# Patient Record
Sex: MALE | HISPANIC OR LATINO | Employment: UNEMPLOYED | URBAN - METROPOLITAN AREA
[De-identification: names, ages, dates, MRNs, and addresses within clinical notes are randomized per-mention and may not be internally consistent; named-entity substitution may affect disease eponyms.]

---

## 2020-07-22 RX ORDER — SODIUM, POTASSIUM,MAG SULFATES 17.5-3.13G
2 SOLUTION, RECONSTITUTED, ORAL ORAL TAKE AS DIRECTED
Qty: 354 ML | Refills: 0 | Status: SHIPPED | OUTPATIENT
Start: 2020-07-22

## 2020-07-26 ENCOUNTER — APPOINTMENT (OUTPATIENT)
Dept: PREADMISSION TESTING | Facility: HOSPITAL | Age: 65
End: 2020-07-26

## 2020-07-26 PROCEDURE — U0002 COVID-19 LAB TEST NON-CDC: HCPCS

## 2020-07-26 PROCEDURE — C9803 HOPD COVID-19 SPEC COLLECT: HCPCS

## 2020-07-26 PROCEDURE — U0004 COV-19 TEST NON-CDC HGH THRU: HCPCS

## 2020-07-27 LAB
REF LAB TEST METHOD: NORMAL
SARS-COV-2 RNA RESP QL NAA+PROBE: NOT DETECTED

## 2020-07-29 ENCOUNTER — OUTSIDE FACILITY SERVICE (OUTPATIENT)
Dept: GASTROENTEROLOGY | Facility: CLINIC | Age: 65
End: 2020-07-29

## 2020-07-29 PROCEDURE — 45385 COLONOSCOPY W/LESION REMOVAL: CPT | Performed by: INTERNAL MEDICINE

## 2020-08-17 ENCOUNTER — PREP FOR SURGERY (OUTPATIENT)
Dept: OTHER | Facility: HOSPITAL | Age: 65
End: 2020-08-17

## 2020-08-17 DIAGNOSIS — Z11.59 SPECIAL SCREENING EXAMINATION FOR UNSPECIFIED VIRAL DISEASE: Primary | ICD-10-CM

## 2020-08-17 DIAGNOSIS — H25.811 COMBINED FORMS OF AGE-RELATED CATARACT OF RIGHT EYE: Primary | ICD-10-CM

## 2020-08-17 RX ORDER — PREDNISOLONE ACETATE 10 MG/ML
1 SUSPENSION/ DROPS OPHTHALMIC SEE ADMIN INSTRUCTIONS
Status: CANCELLED | OUTPATIENT
Start: 2020-08-21

## 2020-08-17 RX ORDER — TETRACAINE HYDROCHLORIDE 5 MG/ML
1 SOLUTION OPHTHALMIC SEE ADMIN INSTRUCTIONS
Status: CANCELLED | OUTPATIENT
Start: 2020-08-21

## 2020-08-17 RX ORDER — CYCLOPENTOLATE HYDROCHLORIDE 20 MG/ML
1 SOLUTION/ DROPS OPHTHALMIC
Status: CANCELLED | OUTPATIENT
Start: 2020-08-21 | End: 2020-08-21

## 2020-08-17 RX ORDER — PHENYLEPHRINE HYDROCHLORIDE 100 MG/ML
1 SOLUTION/ DROPS OPHTHALMIC
Status: CANCELLED | OUTPATIENT
Start: 2020-08-21 | End: 2020-08-21

## 2020-08-17 RX ORDER — SODIUM CHLORIDE 0.9 % (FLUSH) 0.9 %
1-10 SYRINGE (ML) INJECTION AS NEEDED
Status: CANCELLED | OUTPATIENT
Start: 2020-08-21

## 2020-08-17 RX ORDER — SODIUM CHLORIDE 0.9 % (FLUSH) 0.9 %
3 SYRINGE (ML) INJECTION EVERY 12 HOURS SCHEDULED
Status: CANCELLED | OUTPATIENT
Start: 2020-08-21

## 2020-08-18 ENCOUNTER — LAB (OUTPATIENT)
Dept: LAB | Facility: HOSPITAL | Age: 65
End: 2020-08-18

## 2020-08-18 DIAGNOSIS — Z11.59 SPECIAL SCREENING EXAMINATION FOR UNSPECIFIED VIRAL DISEASE: ICD-10-CM

## 2020-08-18 PROBLEM — H25.812 COMBINED FORMS OF AGE-RELATED CATARACT OF LEFT EYE: Status: ACTIVE | Noted: 2020-08-18

## 2020-08-18 PROCEDURE — U0004 COV-19 TEST NON-CDC HGH THRU: HCPCS

## 2020-08-18 PROCEDURE — U0002 COVID-19 LAB TEST NON-CDC: HCPCS

## 2020-08-18 PROCEDURE — C9803 HOPD COVID-19 SPEC COLLECT: HCPCS

## 2020-08-19 LAB
REF LAB TEST METHOD: ABNORMAL
SARS-COV-2 RNA RESP QL NAA+PROBE: DETECTED

## 2020-09-23 ENCOUNTER — OFFICE VISIT (OUTPATIENT)
Dept: GASTROENTEROLOGY | Facility: CLINIC | Age: 65
End: 2020-09-23

## 2020-09-23 VITALS
TEMPERATURE: 97.8 F | HEART RATE: 77 BPM | SYSTOLIC BLOOD PRESSURE: 155 MMHG | DIASTOLIC BLOOD PRESSURE: 90 MMHG | BODY MASS INDEX: 28.44 KG/M2 | WEIGHT: 192.6 LBS

## 2020-09-23 DIAGNOSIS — K21.9 GASTROESOPHAGEAL REFLUX DISEASE WITHOUT ESOPHAGITIS: ICD-10-CM

## 2020-09-23 DIAGNOSIS — K31.84 GASTROPARESIS: ICD-10-CM

## 2020-09-23 DIAGNOSIS — K22.70 BARRETT'S ESOPHAGUS WITHOUT DYSPLASIA: Primary | ICD-10-CM

## 2020-09-23 DIAGNOSIS — Z86.010 HISTORY OF ADENOMATOUS POLYP OF COLON: ICD-10-CM

## 2020-09-23 PROCEDURE — 99214 OFFICE O/P EST MOD 30 MIN: CPT | Performed by: INTERNAL MEDICINE

## 2020-09-23 RX ORDER — PREDNISOLONE ACETATE 10 MG/ML
1 SUSPENSION/ DROPS OPHTHALMIC 2 TIMES DAILY
COMMUNITY

## 2020-09-23 RX ORDER — METOCLOPRAMIDE 10 MG/1
10 TABLET ORAL 4 TIMES DAILY
Qty: 120 TABLET | Refills: 3 | Status: SHIPPED | OUTPATIENT
Start: 2020-09-23

## 2020-09-23 RX ORDER — MAGNESIUM 200 MG
1 TABLET ORAL DAILY
COMMUNITY

## 2020-09-23 NOTE — PROGRESS NOTES
"GASTROENTEROLOGY OFFICE NOTE  Memo Yan  [id number]  1955    CARE TEAM  Patient Care Team:  Provider, No Known as PCP - General    No ref. provider found     Chief Complaint   Patient presents with   • Follow-up     Post EGd and Colonoscopy         HISTORY OF PRESENT ILLNESS:  Patient status post colonoscopy and EGD this summer remarkable adenomatous colonic polyps.    He does have chronic reflux and is felt to have gastroparesis.    His reflux is doing well on omeprazole 40 mg daily but he does still have a lot of postprandial abdominal fullness, bloating and when he gets more bloated than usual he will have regurgitation and on some very rare occasions he is had some vomiting.  He does better with small volume meals and avoiding greasy/fatty foods.    PAST MEDICAL HISTORY  Past Medical History:   Diagnosis Date   • Arthritis    • Broken leg 2017    right   • Cataracts, bilateral     had right cataract removed a \"few weeks ago\" per son   • Colon polyps    • Enlarged prostate    • Gastritis    • GERD (gastroesophageal reflux disease)    • Hyperlipidemia    • Hypertension    • Wears glasses    • Wears partial dentures     upper only asked not to wear adhesive dos        PAST SURGICAL HISTORY  Past Surgical History:   Procedure Laterality Date   • CATARACT EXTRACTION Right    • COLONOSCOPY     • MULTIPLE TOOTH EXTRACTIONS     • OTHER SURGICAL HISTORY  2017    Tibia and Fibula procedure         MEDICATIONS:    Current Outpatient Medications:   •  aspirin 81 MG chewable tablet, Chew 81 mg Daily., Disp: , Rfl:   •  atorvastatin (LIPITOR) 20 MG tablet, Take 20 mg by mouth Daily., Disp: , Rfl:   •  lisinopril-hydrochlorothiazide (PRINZIDE,ZESTORETIC) 20-25 MG per tablet, Take 1 tablet by mouth Daily., Disp: , Rfl:   •  Magnesium 200 MG tablet, Take 1 tablet by mouth Daily., Disp: , Rfl:   •  omeprazole (priLOSEC) 40 MG capsule, Take 40 mg by mouth Daily., Disp: , Rfl:   •  prednisoLONE acetate (PRED FORTE) 1 % " ophthalmic suspension, 1 drop 2 (two) times a day., Disp: , Rfl:   •  tamsulosin (FLOMAX) 0.4 MG capsule 24 hr capsule, Take 1 capsule by mouth Daily., Disp: , Rfl:   •  meloxicam (MOBIC) 7.5 MG tablet, Take 7.5 mg by mouth Daily., Disp: , Rfl:   •  sodium-potassium-magnesium sulfates (Suprep Bowel Prep Kit) 17.5-3.13-1.6 GM/177ML solution oral solution, Take 2 bottles by mouth Take As Directed. Do Not Eat The Day Before Your Procedure. Call [phone] for questions., Disp: 354 mL, Rfl: 0    ALLERGIES  No Known Allergies    FAMILY HISTORY:  Family History   Problem Relation Age of Onset   • Colon cancer Neg Hx    • Colon polyps Neg Hx        SOCIAL HISTORY  Social History     Socioeconomic History   • Marital status:      Spouse name: Not on file   • Number of children: Not on file   • Years of education: Not on file   • Highest education level: Not on file   Tobacco Use   • Smoking status: Former Smoker     Types: Cigarettes     Quit date: 2010     Years since quitting: 10.7   • Smokeless tobacco: Never Used   Substance and Sexual Activity   • Alcohol use: Not Currently     Frequency: Never     Comment: Quit drinking 10 years ago    • Drug use: Never   • Sexual activity: Defer     Socioeconomic History:  .  2 children.  Rarely drinks alcoholic beverages.  Quit smoking 15 years ago       REVIEW OF SYSTEMS  Review of Systems   Constitutional: Negative for activity change, appetite change, chills, diaphoresis, fatigue, fever, unexpected weight gain and unexpected weight loss.   HENT: Positive for dental problem. Negative for congestion, drooling, ear discharge, ear pain, facial swelling, hearing loss, mouth sores, nosebleeds, postnasal drip, rhinorrhea, sinus pressure, sneezing, sore throat, swollen glands, tinnitus, trouble swallowing and voice change.    Respiratory: Negative for apnea, cough, choking, chest tightness, shortness of breath, wheezing and stridor.    Cardiovascular: Negative for chest  pain, palpitations and leg swelling.   Gastrointestinal: Positive for abdominal distention, vomiting, GERD and indigestion. Negative for abdominal pain, anal bleeding, blood in stool, constipation, diarrhea, nausea and rectal pain.   Endocrine: Negative for cold intolerance, heat intolerance, polydipsia, polyphagia and polyuria.   Musculoskeletal: Negative for arthralgias, back pain, gait problem, joint swelling, myalgias, neck pain, neck stiffness and bursitis.   Allergic/Immunologic: Negative for environmental allergies, food allergies and immunocompromised state.   Neurological: Negative for dizziness, tremors, seizures, facial asymmetry, speech difficulty, weakness, light-headedness, numbness and confusion.   Hematological: Negative for adenopathy. Does not bruise/bleed easily.   Psychiatric/Behavioral: Negative for agitation, behavioral problems, decreased concentration, dysphoric mood, hallucinations, self-injury, sleep disturbance, suicidal ideas, negative for hyperactivity, depressed mood and stress. The patient is not nervous/anxious.      I reviewed the above-noted review of systems    PHYSICAL EXAM   There were no vitals taken for this visit.  General: Alert and oriented x 3. In no apparent or acute distress.  and No stigmata of chronic liver disease  HEENT: Anicteric sclera.  Wearing facemask  Neck: Supple. Without lymphadenopathy  CV: Regular rate and rhythm, S1, S2  Lungs: Clear to ausculation. Without rales, rhonchi and wheezing  Abdomen:  Soft,non-distended without palpable masses or hepatosplenomeagaly, areas of rebound tenderness or guarding.   Extremeties: without clubbing, cyanosis or edema  Neurologic:  Alert and oriented x 3 without focal motor or sensory deficits  Rectal exam: deferred        ASSESSMENT  1.-  Short segment Roque's esophagus.  Nondysplastic  2.-  GERD.  3.-  History of adenomatous and tubulovillous adenomatous polyps.  Surveillance colonoscopy recommended in July 2023  4.-   Gastroparesis.    PLAN  1.-  Trial of metoclopramide 10 mg p.o. 3 times daily after before meals.  2.-  Patient made aware of the risk of permanent tardive dyskinesia with long-term use of metoclopramide  3.-  Dietary modification gastroparesis reviewed  4.-  Continue omeprazole  5.-  Surveillance colonoscopy July 2023    Jacob Cuevas MD  9/23/2020   14:52 EDT

## 2020-09-24 PROBLEM — Z86.010 HISTORY OF ADENOMATOUS POLYP OF COLON: Status: ACTIVE | Noted: 2020-09-24

## 2020-09-24 PROBLEM — K22.70 BARRETT'S ESOPHAGUS WITHOUT DYSPLASIA: Status: ACTIVE | Noted: 2020-09-24

## 2020-09-24 PROBLEM — K31.84 GASTROPARESIS: Status: ACTIVE | Noted: 2020-09-24

## 2020-09-24 PROBLEM — K21.9 GASTROESOPHAGEAL REFLUX DISEASE WITHOUT ESOPHAGITIS: Status: ACTIVE | Noted: 2020-09-24

## 2020-09-24 PROBLEM — Z86.0101 HISTORY OF ADENOMATOUS POLYP OF COLON: Status: ACTIVE | Noted: 2020-09-24

## 2020-10-11 ENCOUNTER — APPOINTMENT (OUTPATIENT)
Dept: PREADMISSION TESTING | Facility: HOSPITAL | Age: 65
End: 2020-10-11

## 2020-10-11 PROCEDURE — U0004 COV-19 TEST NON-CDC HGH THRU: HCPCS | Performed by: INTERNAL MEDICINE

## 2020-10-11 PROCEDURE — C9803 HOPD COVID-19 SPEC COLLECT: HCPCS

## 2020-10-12 LAB — SARS-COV-2 RNA RESP QL NAA+PROBE: NOT DETECTED

## 2020-11-18 ENCOUNTER — OFFICE VISIT (OUTPATIENT)
Dept: GASTROENTEROLOGY | Facility: CLINIC | Age: 65
End: 2020-11-18

## 2020-11-18 VITALS
SYSTOLIC BLOOD PRESSURE: 156 MMHG | BODY MASS INDEX: 29.33 KG/M2 | TEMPERATURE: 97.8 F | WEIGHT: 198.6 LBS | DIASTOLIC BLOOD PRESSURE: 85 MMHG | HEART RATE: 74 BPM

## 2020-11-18 DIAGNOSIS — K22.70 BARRETT'S ESOPHAGUS WITHOUT DYSPLASIA: ICD-10-CM

## 2020-11-18 DIAGNOSIS — K31.84 GASTROPARESIS: ICD-10-CM

## 2020-11-18 DIAGNOSIS — K21.9 GASTROESOPHAGEAL REFLUX DISEASE WITHOUT ESOPHAGITIS: Primary | ICD-10-CM

## 2020-11-18 DIAGNOSIS — Z86.010 HISTORY OF ADENOMATOUS POLYP OF COLON: ICD-10-CM

## 2020-11-18 PROCEDURE — 99213 OFFICE O/P EST LOW 20 MIN: CPT | Performed by: INTERNAL MEDICINE

## 2020-11-18 NOTE — PROGRESS NOTES
"GASTROENTEROLOGY OFFICE NOTE  Memo Yan  [id number]  1955    CARE TEAM  Patient Care Team:  Davis Gray MD as PCP - General (Internal Medicine)    No ref. provider found     Chief Complaint   Patient presents with   • Follow-up   • Roque's Esophagus   • Heartburn        HISTORY OF PRESENT ILLNESS:  Patient presents for follow-up of GERD/Roque's esophagus.    Patient states that he is doing well on omeprazole 40 mg daily and in addition to this taking metoclopramide once or twice daily has resolved his vomiting.  He is eating better denies dysphagia, odynophagia, early satiety, postprandial fullness or bloating.  He states he has modified his diet with small frequent meals avoiding greasy/fatty foods and overall his dyspeptic symptoms have improved.      PAST MEDICAL HISTORY  Past Medical History:   Diagnosis Date   • Arthritis    • Broken leg 2017    right   • Cataracts, bilateral     had right cataract removed a \"few weeks ago\" per son   • Colon polyps    • Enlarged prostate    • Gastritis    • GERD (gastroesophageal reflux disease)    • Hyperlipidemia    • Hypertension    • Wears glasses    • Wears partial dentures     upper only asked not to wear adhesive dos        PAST SURGICAL HISTORY  Past Surgical History:   Procedure Laterality Date   • CATARACT EXTRACTION Right    • COLONOSCOPY     • MULTIPLE TOOTH EXTRACTIONS     • OTHER SURGICAL HISTORY  2017    Tibia and Fibula procedure         MEDICATIONS:    Current Outpatient Medications:   •  aspirin 81 MG chewable tablet, Chew 81 mg Daily., Disp: , Rfl:   •  atorvastatin (LIPITOR) 20 MG tablet, Take 20 mg by mouth Daily., Disp: , Rfl:   •  lisinopril-hydrochlorothiazide (PRINZIDE,ZESTORETIC) 20-25 MG per tablet, Take 1 tablet by mouth Daily., Disp: , Rfl:   •  Magnesium 200 MG tablet, Take 1 tablet by mouth Daily., Disp: , Rfl:   •  meloxicam (MOBIC) 7.5 MG tablet, Take 7.5 mg by mouth Daily., Disp: , Rfl:   •  metoclopramide (REGLAN) 10 MG " tablet, Take 1 tablet by mouth 4 (Four) Times a Day., Disp: 120 tablet, Rfl: 3  •  omeprazole (priLOSEC) 40 MG capsule, Take 40 mg by mouth Daily., Disp: , Rfl:   •  prednisoLONE acetate (PRED FORTE) 1 % ophthalmic suspension, 1 drop 2 (two) times a day., Disp: , Rfl:   •  sodium-potassium-magnesium sulfates (Suprep Bowel Prep Kit) 17.5-3.13-1.6 GM/177ML solution oral solution, Take 2 bottles by mouth Take As Directed. Do Not Eat The Day Before Your Procedure. Call [phone] for questions., Disp: 354 mL, Rfl: 0  •  tamsulosin (FLOMAX) 0.4 MG capsule 24 hr capsule, Take 1 capsule by mouth Daily., Disp: , Rfl:     ALLERGIES  No Known Allergies    FAMILY HISTORY:  Family History   Problem Relation Age of Onset   • Colon cancer Neg Hx    • Colon polyps Neg Hx        SOCIAL HISTORY  Social History     Socioeconomic History   • Marital status:      Spouse name: Not on file   • Number of children: Not on file   • Years of education: Not on file   • Highest education level: Not on file   Tobacco Use   • Smoking status: Former Smoker     Types: Cigarettes     Quit date: 2010     Years since quitting: 10.8   • Smokeless tobacco: Never Used   Substance and Sexual Activity   • Alcohol use: Not Currently     Frequency: Never     Comment: Quit drinking 10 years ago    • Drug use: Never   • Sexual activity: Defer     Socioeconomic History:  He is  with children.  Quit drinking 10 years ago and quit smoking in 2010.       REVIEW OF SYSTEMS  Review of Systems   Constitutional: Negative for activity change, appetite change, chills, diaphoresis, fatigue, fever, unexpected weight gain and unexpected weight loss.   HENT: Negative for trouble swallowing and voice change.    Gastrointestinal: Positive for GERD. Negative for abdominal distention, abdominal pain, anal bleeding, blood in stool, constipation, diarrhea, nausea, rectal pain, vomiting and indigestion.     I reviewed the above-noted review of systems.    PHYSICAL  EXAM   /85 (BP Location: Left arm, Patient Position: Sitting, Cuff Size: Adult)   Pulse 74   Temp 97.8 °F (36.6 °C) (Temporal)   Wt 90.1 kg (198 lb 9.6 oz)   BMI 29.33 kg/m²   General: Alert and oriented x 3. In no apparent or acute distress.  and No stigmata of chronic liver disease  HEENT: Wearing facemask.  Anicteric sclera  Neck: Supple. Without lymphadenopathy  CV: Regular rate and rhythm, S1, S2  Lungs: Clear to ausculation. Without rales, rhonchi and wheezing  Abdomen:  Soft,non-distended without palpable masses or hepatosplenomeagaly, areas of rebound tenderness or guarding.   Extremeties: without clubbing, cyanosis or edema  Neurologic:  Alert and oriented x 3 without focal motor or sensory deficits  Rectal exam: deferred         ASSESSMENT  1.-Nondysplastic Roque's esophagus  2.-GERD  3.-Gastroparesis.  Patient reminded not to take metoclopramide on a regular basis due to the risk for permanent tardive dyskinesia.  Signs and symptoms of tardive dyskinesia were reviewed and he was counseled to take this no more than a few days per month.  If he needs to take it more frequently than that for control of his symptoms he is counseled to purchase so we can talk about alternatives such as domperidone, cisapride, another prokinetic agent such as prucalopride.  4.-History of adenomatous colonic polyps.    PLAN  1.-Patient to minimize use of metoclopramide  2.-Dietary modification gastroparesis again reviewed  3.-Continue omeprazole indefinitely  4.-Surveillance colonoscopy due July 2023  5.-Surveillance EGD recommended 2023  6.-GI follow-up as needed        Jacob Cuevas MD  11/18/2020   15:00 EST

## 2024-04-30 ENCOUNTER — OFFICE VISIT (OUTPATIENT)
Dept: GASTROENTEROLOGY | Facility: CLINIC | Age: 69
End: 2024-04-30
Payer: MEDICAID

## 2024-04-30 VITALS
HEART RATE: 72 BPM | WEIGHT: 185 LBS | BODY MASS INDEX: 28.04 KG/M2 | HEIGHT: 68 IN | OXYGEN SATURATION: 99 % | TEMPERATURE: 98 F

## 2024-04-30 DIAGNOSIS — K31.84 GASTROPARESIS: ICD-10-CM

## 2024-04-30 DIAGNOSIS — K21.9 GASTROESOPHAGEAL REFLUX DISEASE WITHOUT ESOPHAGITIS: ICD-10-CM

## 2024-04-30 DIAGNOSIS — Z86.010 HISTORY OF ADENOMATOUS POLYP OF COLON: ICD-10-CM

## 2024-04-30 DIAGNOSIS — K22.70 BARRETT'S ESOPHAGUS WITHOUT DYSPLASIA: Primary | ICD-10-CM

## 2024-04-30 RX ORDER — FAMOTIDINE 20 MG/1
20 TABLET, FILM COATED ORAL 2 TIMES DAILY
COMMUNITY

## 2024-04-30 RX ORDER — BISOPROLOL FUMARATE 5 MG/1
5 TABLET, FILM COATED ORAL DAILY
COMMUNITY

## 2024-04-30 RX ORDER — ATORVASTATIN CALCIUM 20 MG/1
20 TABLET, FILM COATED ORAL DAILY
COMMUNITY

## 2024-04-30 RX ORDER — PANTOPRAZOLE SODIUM 40 MG/1
40 TABLET, DELAYED RELEASE ORAL 2 TIMES DAILY
Qty: 60 TABLET | Refills: 11 | Status: SHIPPED | OUTPATIENT
Start: 2024-04-30

## 2024-04-30 RX ORDER — TAMSULOSIN HYDROCHLORIDE 0.4 MG/1
1 CAPSULE ORAL DAILY
COMMUNITY

## 2024-04-30 NOTE — PROGRESS NOTES
"GASTROENTEROLOGY OFFICE NOTE  Memo Yan  [id number]  1955      Chief Complaint   Patient presents with    Gastrosophageal reflux disease without esophagitis, Roque    History of adenomatous polyps    Gastroparesis        HISTORY OF PRESENT ILLNESS:  69-year-old white male presents for follow-up.  He has a history of Roque's esophagus.  He is currently taking famotidine.  His gastritis is \"getting worse \".  He has occasional problems with GERD related chest pain.  Although better now he states that recently he had noticed some vomiting 2-3 times per week.    He has history of a tubular adenoma on his 2020 colonoscopy for which a 3-year surveillance interval was recommended.    Fortunately, he is not having any problems with dysphagia to solids or odynophagia.  He denies early satiety or unexplained weight loss.  He denies melena or bright red blood per rectum.  Denies any particular changes in his usual bowel habits.    PAST MEDICAL HISTORY  Past Medical History:    Arthritis    Broken leg    right    Cataracts, bilateral    had right cataract removed a \"few weeks ago\" per son    Colon polyps    Enlarged prostate    Gastritis    GERD (gastroesophageal reflux disease)    Hyperlipidemia    Hypertension    Wears glasses    Wears partial dentures    upper only asked not to wear adhesive dos        PAST SURGICAL HISTORY  Past Surgical History:    CATARACT EXTRACTION    COLONOSCOPY    MULTIPLE TOOTH EXTRACTIONS    OTHER SURGICAL HISTORY    Tibia and Fibula procedure     UPPER GASTROINTESTINAL ENDOSCOPY        MEDICATIONS:    Current Outpatient Medications:     atorvastatin (LIPITOR) 20 MG tablet, Take 1 tablet by mouth Daily., Disp: , Rfl:     bisoprolol (ZEBeta) 5 MG tablet, Take 1 tablet by mouth Daily., Disp: , Rfl:     famotidine (PEPCID) 20 MG tablet, Take 1 tablet by mouth 2 (Two) Times a Day., Disp: , Rfl:     Magnesium 200 MG tablet, Take 1 tablet by mouth Daily., Disp: , Rfl:     tamsulosin (FLOMAX) " "0.4 MG capsule 24 hr capsule, Take 1 capsule by mouth Daily., Disp: , Rfl:     pantoprazole (PROTONIX) 40 MG EC tablet, Take 1 tablet by mouth 2 (Two) Times a Day., Disp: 60 tablet, Rfl: 11    prednisoLONE acetate (PRED FORTE) 1 % ophthalmic suspension, 1 drop 2 (two) times a day. (Patient not taking: Reported on 4/30/2024), Disp: , Rfl:     ALLERGIES  has No Known Allergies.    FAMILY HISTORY:  Cancer-related family history is negative for Colon cancer.  Colon Cancer-related family history is negative for Colon cancer and Colon polyps.    SOCIAL HISTORY  He  reports that he quit smoking about 14 years ago. His smoking use included cigarettes. He has never used smokeless tobacco. He reports that he does not currently use alcohol. He reports that he does not use drugs.   He is .  He works as a .  He has 5 children and 15 grandchildren      PHYSICAL EXAM   Pulse 72   Temp 98 °F (36.7 °C) (Infrared)   Ht 172.7 cm (68\")   Wt 83.9 kg (185 lb)   SpO2 99%   BMI 28.13 kg/m²   General: Alert and oriented x 3. In no apparent or acute distress.  and No stigmata of chronic liver disease  HEENT: Anicteric sclerae. Normal oropharynx  Neck: Supple. Without lymphadenopathy  CV: Regular rate and rhythm, S1, S2  Lungs: Clear to ausculation. Without rales, rhonchi and wheezing  Abdomen:  Soft,non-distended without palpable masses or hepatosplenomeagaly, areas of rebound tenderness or guarding.   Extremeties: without clubbing, cyanosis or edema  Neurologic:  Alert and oriented x 3 without focal motor or sensory deficits  Rectal exam: deferred       ASSESSMENT  1.-Roque's esophagus.  I counseled him that he needs to be on a proton pump inhibitor indefinitely rather than an H2 blocker as H2 blockers have not been shown to decrease the risk of Roque's esophagus progressing to malignancy as proton pump inhibitors have.  He is due for surveillance and is agreeable to returning for EGD  2.-History of adenomatous " colonic polyps due for surveillance  3.-Atypical chest pain likely GERD related.  He is to resume a proton pump inhibitor which will hopefully resolve his atypical chest pain as well as his recurrent problems with nausea and vomiting.  4.-History of gastroparesis.  This is contributing to his nausea vomiting.  Dietary modification gastroparesis should be followed.  He has done well with metoclopramide in the past    PLAN  1.-Schedule EGD for surveillance of Roque's esophagus as well as exclusion of erosive esophagitis, peptic ulcer disease, upper GI neoplasia, gastric outlet obstruction  2.-Reinitiate proton pump inhibitor.  We will start him on pantoprazole 40 mg p.o. twice daily  3.-Schedule colonoscopy for surveillance purposes  4.-Further recommendation deferred pending findings of his endoscopic studies and response to pantoprazole 40 mg p.o. twice daily      Jacob Cuevas MD  5/5/2024   12:49 EDT

## 2024-06-04 RX ORDER — SODIUM, POTASSIUM,MAG SULFATES 17.5-3.13G
2 SOLUTION, RECONSTITUTED, ORAL ORAL TAKE AS DIRECTED
Qty: 354 ML | Refills: 0 | Status: SHIPPED | OUTPATIENT
Start: 2024-06-04

## 2024-06-19 ENCOUNTER — OUTSIDE FACILITY SERVICE (OUTPATIENT)
Dept: GASTROENTEROLOGY | Facility: CLINIC | Age: 69
End: 2024-06-19

## 2024-06-19 PROCEDURE — 43239 EGD BIOPSY SINGLE/MULTIPLE: CPT | Performed by: INTERNAL MEDICINE

## 2024-06-19 PROCEDURE — 45385 COLONOSCOPY W/LESION REMOVAL: CPT | Performed by: INTERNAL MEDICINE

## 2025-06-19 RX ORDER — PANTOPRAZOLE SODIUM 40 MG/1
40 TABLET, DELAYED RELEASE ORAL 2 TIMES DAILY
Qty: 60 TABLET | Refills: 11 | Status: SHIPPED | OUTPATIENT
Start: 2025-06-19